# Patient Record
Sex: MALE | Race: WHITE | NOT HISPANIC OR LATINO | URBAN - METROPOLITAN AREA
[De-identification: names, ages, dates, MRNs, and addresses within clinical notes are randomized per-mention and may not be internally consistent; named-entity substitution may affect disease eponyms.]

---

## 2021-03-04 ENCOUNTER — OUTPATIENT (OUTPATIENT)
Dept: OUTPATIENT SERVICES | Facility: HOSPITAL | Age: 46
LOS: 1 days | End: 2021-03-04

## 2021-03-04 ENCOUNTER — INPATIENT (INPATIENT)
Facility: HOSPITAL | Age: 46
LOS: 3 days | Discharge: HOME | End: 2021-03-08
Attending: STUDENT IN AN ORGANIZED HEALTH CARE EDUCATION/TRAINING PROGRAM
Payer: COMMERCIAL

## 2021-03-04 PROCEDURE — 93010 ELECTROCARDIOGRAM REPORT: CPT

## 2021-03-04 PROCEDURE — 71045 X-RAY EXAM CHEST 1 VIEW: CPT | Mod: 26

## 2021-03-04 PROCEDURE — 71275 CT ANGIOGRAPHY CHEST: CPT | Mod: 26

## 2021-03-04 PROCEDURE — 99285 EMERGENCY DEPT VISIT HI MDM: CPT

## 2021-03-05 ENCOUNTER — OUTPATIENT (OUTPATIENT)
Dept: OUTPATIENT SERVICES | Facility: HOSPITAL | Age: 46
LOS: 1 days | End: 2021-03-05

## 2021-03-06 ENCOUNTER — OUTPATIENT (OUTPATIENT)
Dept: OUTPATIENT SERVICES | Facility: HOSPITAL | Age: 46
LOS: 1 days | End: 2021-03-06

## 2021-03-07 ENCOUNTER — OUTPATIENT (OUTPATIENT)
Dept: OUTPATIENT SERVICES | Facility: HOSPITAL | Age: 46
LOS: 1 days | End: 2021-03-07

## 2021-03-08 ENCOUNTER — OUTPATIENT (OUTPATIENT)
Dept: OUTPATIENT SERVICES | Facility: HOSPITAL | Age: 46
LOS: 1 days | End: 2021-03-08

## 2021-04-02 PROBLEM — Z00.00 ENCOUNTER FOR PREVENTIVE HEALTH EXAMINATION: Status: ACTIVE | Noted: 2021-04-02

## 2021-04-06 ENCOUNTER — APPOINTMENT (OUTPATIENT)
Dept: PULMONOLOGY | Facility: CLINIC | Age: 46
End: 2021-04-06
Payer: COMMERCIAL

## 2021-04-06 VITALS
WEIGHT: 186 LBS | BODY MASS INDEX: 28.19 KG/M2 | RESPIRATION RATE: 16 BRPM | DIASTOLIC BLOOD PRESSURE: 82 MMHG | HEART RATE: 78 BPM | OXYGEN SATURATION: 98 % | HEIGHT: 68 IN | TEMPERATURE: 98.2 F | SYSTOLIC BLOOD PRESSURE: 124 MMHG

## 2021-04-06 DIAGNOSIS — Z09 ENCOUNTER FOR FOLLOW-UP EXAMINATION AFTER COMPLETED TREATMENT FOR CONDITIONS OTHER THAN MALIGNANT NEOPLASM: ICD-10-CM

## 2021-04-06 PROCEDURE — 99205 OFFICE O/P NEW HI 60 MIN: CPT

## 2021-04-06 PROCEDURE — 99072 ADDL SUPL MATRL&STAF TM PHE: CPT

## 2021-04-06 NOTE — DISCUSSION/SUMMARY
[FreeTextEntry1] : 46-year-old male, seen today status post recent hospitalization for Covid 19. 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 Minimal residual complaints. Will followup with a CT and pulmonary function tests in 3 months

## 2021-04-06 NOTE — HISTORY OF PRESENT ILLNESS
[TextBox_4] : Hospitalized at [facility] [place]8/21 for COVID 19\par PCR + 2/23/21 with chills, myalgias and 2days diarrhea\par Treated Decadron, Remdesivir and O2\par Discharged on 2LPM NCO\par Stopped using O2 one week ago\par Marked improvement 10 days ag and now has min agnosia\par Still has minimal SOB above baseline

## 2021-04-06 NOTE — PROCEDURE
[FreeTextEntry1] : EXAM: CT ANGIO CHEST PE PROTOCOL IC\par \par PROCEDURE DATE: 03/04/2021\par \par \par \par \par INTERPRETATION: CLINICAL INFORMATION: Shortness of breath. COVID\par \par COMPARISON: None.\par \par CONTRAST/COMPLICATIONS:\par IV Contrast: 90 mL Optiray 350\par \par PROCEDURE:\par CT Angiography of the Chest.\par Sagittal and coronal reformats were performed as well as 3D (MIP) reconstructions.\par \par FINDINGS:\par \par LUNGS AND AIRWAYS: Patent central airways. Bilateral lung opacities with a peripheral and lower lung predominance.\par PLEURA: No pleural effusion.\par MEDIASTINUM AND TRACIE: No lymphadenopathy.\par VESSELS: Evaluation of the pulmonary arteries is limited secondary to the timing of the contrast bolus, otherwise there is no pulmonary embolism. Thoracic aorta normal in caliber.\par HEART: Heart size is normal. No pericardial effusion.\par CHEST WALL AND LOWER NECK: No enlarged axillary lymph nodes.\par VISUALIZED UPPER ABDOMEN: Unremarkable.\par BONES: No aggressive osseous lesion.\par \par IMPRESSION:\par No pulmonary embolism\par \par Bilateral lung opacities compatible with COVID pneumonia.\par \par \par RASHAWN GOTTI MD; Attending Radiologist\par This document has been electronically signed. Mar 4 2021 4:44PM

## 2021-06-23 ENCOUNTER — APPOINTMENT (OUTPATIENT)
Dept: CT IMAGING | Facility: CLINIC | Age: 46
End: 2021-06-23
Payer: COMMERCIAL

## 2021-06-23 ENCOUNTER — OUTPATIENT (OUTPATIENT)
Dept: OUTPATIENT SERVICES | Facility: HOSPITAL | Age: 46
LOS: 1 days | End: 2021-06-23
Payer: COMMERCIAL

## 2021-06-23 DIAGNOSIS — U07.1 COVID-19: ICD-10-CM

## 2021-06-23 PROCEDURE — 71250 CT THORAX DX C-: CPT | Mod: 26

## 2021-06-23 PROCEDURE — 71250 CT THORAX DX C-: CPT

## 2021-07-06 ENCOUNTER — APPOINTMENT (OUTPATIENT)
Dept: PULMONOLOGY | Facility: CLINIC | Age: 46
End: 2021-07-06

## 2021-07-12 ENCOUNTER — APPOINTMENT (OUTPATIENT)
Dept: DISASTER EMERGENCY | Facility: CLINIC | Age: 46
End: 2021-07-12

## 2021-07-14 ENCOUNTER — APPOINTMENT (OUTPATIENT)
Dept: PULMONOLOGY | Facility: CLINIC | Age: 46
End: 2021-07-14
Payer: COMMERCIAL

## 2021-07-14 VITALS — OXYGEN SATURATION: 97 % | HEART RATE: 110 BPM | DIASTOLIC BLOOD PRESSURE: 60 MMHG | SYSTOLIC BLOOD PRESSURE: 106 MMHG

## 2021-07-14 VITALS — HEIGHT: 66 IN | WEIGHT: 182 LBS | BODY MASS INDEX: 29.25 KG/M2

## 2021-07-14 DIAGNOSIS — U07.1 COVID-19: ICD-10-CM

## 2021-07-14 PROCEDURE — 85018 HEMOGLOBIN: CPT | Mod: QW

## 2021-07-14 PROCEDURE — 94010 BREATHING CAPACITY TEST: CPT

## 2021-07-14 PROCEDURE — 99072 ADDL SUPL MATRL&STAF TM PHE: CPT

## 2021-07-14 PROCEDURE — 94729 DIFFUSING CAPACITY: CPT

## 2021-07-14 PROCEDURE — 94727 GAS DIL/WSHOT DETER LNG VOL: CPT

## 2022-06-03 ENCOUNTER — APPOINTMENT (OUTPATIENT)
Dept: ORTHOPEDIC SURGERY | Facility: CLINIC | Age: 47
End: 2022-06-03
Payer: COMMERCIAL

## 2022-06-03 VITALS — BODY MASS INDEX: 26.66 KG/M2 | HEIGHT: 69 IN | WEIGHT: 180 LBS

## 2022-06-03 DIAGNOSIS — S43.491A OTHER SPRAIN OF RIGHT SHOULDER JOINT, INITIAL ENCOUNTER: ICD-10-CM

## 2022-06-03 DIAGNOSIS — Z78.9 OTHER SPECIFIED HEALTH STATUS: ICD-10-CM

## 2022-06-03 PROCEDURE — 73030 X-RAY EXAM OF SHOULDER: CPT | Mod: RT

## 2022-06-03 PROCEDURE — 99203 OFFICE O/P NEW LOW 30 MIN: CPT

## 2022-06-03 NOTE — DISCUSSION/SUMMARY
[de-identified] : Patient allowed to gently start resuming activities. \par Discussed change to medication prescription and usage. \par \par  \par \par RE:  SUZY VICKI \par \par Acct #- [id number] \par \par \par \par Attention:  Nurse Reviewer /Medical Director\par \par  \par Based on my patient's condition, I strongly believe that the MRI right shoulder s medically.necessary.  \par The patient has failed oral meds, injections and PT and conservative treatment in combination or by themselves and therefore needs the MRI.  \par The MRI will dictate further treatment t recommendations.\par \par

## 2022-06-03 NOTE — HISTORY OF PRESENT ILLNESS
[Gradual] : gradual [Result of repetitive motion] : result of repetitive motion [7] : 7 [0] : 0 [Burning] : burning [Localized] : localized [Stabbing] : stabbing [Sleep] : sleep [Nothing helps with pain getting better] : Nothing helps with pain getting better [Lying in bed] : lying in bed [Full time] : Work status: full time [de-identified] : 47 year old RHD male with pain in the right shoulder, symptoms started a couple months ago. Nothing specifically happened but he had a couple falls snow boarding and unsure if something happened them. He has pain with push ups, rotation. No radicular complaints.  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

## 2022-06-03 NOTE — PHYSICAL EXAM
[Right] : right shoulder [There are no fractures, subluxations or dislocations. No significant abnormalities are seen] : There are no fractures, subluxations or dislocations. No significant abnormalities are seen [] : no erythema [TWNoteComboBox7] : active forward flexion 165 degrees [TWNoteComboBox6] : internal rotation L2 [de-identified] : external rotation 60 degrees

## 2022-06-07 ENCOUNTER — FORM ENCOUNTER (OUTPATIENT)
Age: 47
End: 2022-06-07

## 2022-06-08 ENCOUNTER — APPOINTMENT (OUTPATIENT)
Dept: MRI IMAGING | Facility: CLINIC | Age: 47
End: 2022-06-08
Payer: COMMERCIAL

## 2022-06-08 PROCEDURE — 73221 MRI JOINT UPR EXTREM W/O DYE: CPT | Mod: RT

## 2022-06-17 ENCOUNTER — APPOINTMENT (OUTPATIENT)
Dept: ORTHOPEDIC SURGERY | Facility: CLINIC | Age: 47
End: 2022-06-17
Payer: COMMERCIAL

## 2022-06-17 VITALS — HEIGHT: 69 IN | BODY MASS INDEX: 26.66 KG/M2 | WEIGHT: 180 LBS

## 2022-06-17 DIAGNOSIS — S43.421D SPRAIN OF RIGHT ROTATOR CUFF CAPSULE, SUBSEQUENT ENCOUNTER: ICD-10-CM

## 2022-06-17 DIAGNOSIS — S43.431D SUPERIOR GLENOID LABRUM LESION OF RIGHT SHOULDER, SUBSEQUENT ENCOUNTER: ICD-10-CM

## 2022-06-17 DIAGNOSIS — M77.8 OTHER ENTHESOPATHIES, NOT ELSEWHERE CLASSIFIED: ICD-10-CM

## 2022-06-17 DIAGNOSIS — Z78.9 OTHER SPECIFIED HEALTH STATUS: ICD-10-CM

## 2022-06-17 PROCEDURE — 99214 OFFICE O/P EST MOD 30 MIN: CPT

## 2022-06-17 NOTE — DISCUSSION/SUMMARY
[de-identified] : Patient allowed to gently start resuming activities. \par Discussed change to medication prescription and usage. \par Bracing options discussed with patient. \par Activity modification as needed\par Discussed poss future surgery, pt deciding\par discussed MRI and poss surgery, questions answered, no guarantees

## 2022-06-17 NOTE — PHYSICAL EXAM
[Right] : right shoulder [] : motor and sensory intact distally [TWNoteComboBox7] : active forward flexion 165 degrees [TWNoteComboBox6] : internal rotation L2 [de-identified] : external rotation 60 degrees

## 2022-06-17 NOTE — DATA REVIEWED
[MRI] : MRI [Right] : of the right [Shoulder] : shoulder [Report was reviewed and noted in the chart] : The report was reviewed and noted in the chart [I reviewed the films/CD and agree] : I reviewed the films/CD and agree [FreeTextEntry1] : extensive labral tear, IS and RTC strain

## 2022-06-17 NOTE — HISTORY OF PRESENT ILLNESS
[Localized] : localized [Nothing helps with pain getting better] : Nothing helps with pain getting better [Full time] : Work status: full time [de-identified] : 47 year old RHD male with pain in the right shoulder, symptoms started a couple months ago. Nothing specifically happened but he had a couple falls snow boarding and unsure if something happened them. He has pain with push ups, rotation. No radicular complaints.  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

## 2023-11-20 ENCOUNTER — APPOINTMENT (OUTPATIENT)
Dept: ORTHOPEDIC SURGERY | Facility: CLINIC | Age: 48
End: 2023-11-20
Payer: COMMERCIAL

## 2023-11-20 VITALS — HEIGHT: 69 IN | BODY MASS INDEX: 26.66 KG/M2 | WEIGHT: 180 LBS

## 2023-11-20 DIAGNOSIS — Z78.9 OTHER SPECIFIED HEALTH STATUS: ICD-10-CM

## 2023-11-20 DIAGNOSIS — M47.817 SPONDYLOSIS W/OUT MYELOPATHY OR RADICULOPATHY, LUMBOSACRAL REGION: ICD-10-CM

## 2023-11-20 DIAGNOSIS — M51.36 OTHER INTERVERTEBRAL DISC DEGENERATION, LUMBAR REGION: ICD-10-CM

## 2023-11-20 DIAGNOSIS — M51.37 OTHER INTERVERTEBRAL DISC DEGENERATION, LUMBOSACRAL REGION: ICD-10-CM

## 2023-11-20 DIAGNOSIS — M48.061 SPINAL STENOSIS, LUMBAR REGION WITHOUT NEUROGENIC CLAUDICATION: ICD-10-CM

## 2023-11-20 PROCEDURE — 99204 OFFICE O/P NEW MOD 45 MIN: CPT

## 2023-11-20 PROCEDURE — 99214 OFFICE O/P EST MOD 30 MIN: CPT

## 2023-11-20 PROCEDURE — 72100 X-RAY EXAM L-S SPINE 2/3 VWS: CPT

## 2023-11-20 RX ORDER — METHYLPREDNISOLONE 4 MG/1
4 TABLET ORAL
Qty: 1 | Refills: 0 | Status: ACTIVE | COMMUNITY
Start: 2023-11-20 | End: 1900-01-01

## 2023-11-22 ENCOUNTER — RESULT REVIEW (OUTPATIENT)
Age: 48
End: 2023-11-22